# Patient Record
Sex: FEMALE | Race: WHITE | NOT HISPANIC OR LATINO | Employment: FULL TIME | URBAN - METROPOLITAN AREA
[De-identification: names, ages, dates, MRNs, and addresses within clinical notes are randomized per-mention and may not be internally consistent; named-entity substitution may affect disease eponyms.]

---

## 2022-01-12 ENCOUNTER — OFFICE VISIT (OUTPATIENT)
Dept: URGENT CARE | Facility: CLINIC | Age: 27
End: 2022-01-12
Payer: COMMERCIAL

## 2022-01-12 ENCOUNTER — HOSPITAL ENCOUNTER (OUTPATIENT)
Facility: MEDICAL CENTER | Age: 27
End: 2022-01-12
Attending: NURSE PRACTITIONER
Payer: COMMERCIAL

## 2022-01-12 VITALS
DIASTOLIC BLOOD PRESSURE: 94 MMHG | HEART RATE: 86 BPM | OXYGEN SATURATION: 99 % | WEIGHT: 240 LBS | TEMPERATURE: 98.3 F | RESPIRATION RATE: 18 BRPM | BODY MASS INDEX: 42.52 KG/M2 | SYSTOLIC BLOOD PRESSURE: 122 MMHG | HEIGHT: 63 IN

## 2022-01-12 DIAGNOSIS — J04.0 LARYNGITIS: ICD-10-CM

## 2022-01-12 DIAGNOSIS — J40 BRONCHITIS: ICD-10-CM

## 2022-01-12 DIAGNOSIS — R05.9 COUGH: ICD-10-CM

## 2022-01-12 PROCEDURE — U0005 INFEC AGEN DETEC AMPLI PROBE: HCPCS

## 2022-01-12 PROCEDURE — 99203 OFFICE O/P NEW LOW 30 MIN: CPT | Performed by: NURSE PRACTITIONER

## 2022-01-12 PROCEDURE — U0003 INFECTIOUS AGENT DETECTION BY NUCLEIC ACID (DNA OR RNA); SEVERE ACUTE RESPIRATORY SYNDROME CORONAVIRUS 2 (SARS-COV-2) (CORONAVIRUS DISEASE [COVID-19]), AMPLIFIED PROBE TECHNIQUE, MAKING USE OF HIGH THROUGHPUT TECHNOLOGIES AS DESCRIBED BY CMS-2020-01-R: HCPCS

## 2022-01-12 RX ORDER — ALBUTEROL SULFATE 90 UG/1
2 AEROSOL, METERED RESPIRATORY (INHALATION) EVERY 6 HOURS PRN
Qty: 8.5 G | Refills: 0 | Status: SHIPPED | OUTPATIENT
Start: 2022-01-12

## 2022-01-12 RX ORDER — METHYLPREDNISOLONE 4 MG/1
TABLET ORAL
Qty: 21 TABLET | Refills: 0 | Status: SHIPPED
Start: 2022-01-12 | End: 2022-02-22

## 2022-01-12 ASSESSMENT — ENCOUNTER SYMPTOMS
SHORTNESS OF BREATH: 1
CHILLS: 0
COUGH: 1
FEVER: 0

## 2022-01-13 DIAGNOSIS — J04.0 LARYNGITIS: ICD-10-CM

## 2022-01-13 DIAGNOSIS — R05.9 COUGH: ICD-10-CM

## 2022-01-13 DIAGNOSIS — J40 BRONCHITIS: ICD-10-CM

## 2022-01-13 LAB — COVID ORDER STATUS COVID19: NORMAL

## 2022-01-13 NOTE — PROGRESS NOTES
Heidi Reese is a 26 y.o. female who presents with Sore Throat (x1 week, currently pregnant ), Cough (x2 days ), Runny Nose, and Shortness of Breath    No past medical history on file.  Social History     Socioeconomic History   • Marital status: Single     Spouse name: Not on file   • Number of children: Not on file   • Years of education: Not on file   • Highest education level: Not on file   Occupational History   • Not on file   Tobacco Use   • Smoking status: Never Smoker   • Smokeless tobacco: Never Used   Vaping Use   • Vaping Use: Never used   Substance and Sexual Activity   • Alcohol use: No   • Drug use: No   • Sexual activity: Not on file   Other Topics Concern   • Not on file   Social History Narrative   • Not on file     Social Determinants of Health     Financial Resource Strain:    • Difficulty of Paying Living Expenses: Not on file   Food Insecurity:    • Worried About Running Out of Food in the Last Year: Not on file   • Ran Out of Food in the Last Year: Not on file   Transportation Needs:    • Lack of Transportation (Medical): Not on file   • Lack of Transportation (Non-Medical): Not on file   Physical Activity:    • Days of Exercise per Week: Not on file   • Minutes of Exercise per Session: Not on file   Stress:    • Feeling of Stress : Not on file   Social Connections:    • Frequency of Communication with Friends and Family: Not on file   • Frequency of Social Gatherings with Friends and Family: Not on file   • Attends [employer] Services: Not on file   • Active Member of Clubs or Organizations: Not on file   • Attends Club or Organization Meetings: Not on file   • Marital Status: Not on file   Intimate Partner Violence:    • Fear of Current or Ex-Partner: Not on file   • Emotionally Abused: Not on file   • Physically Abused: Not on file   • Sexually Abused: Not on file   Housing Stability:    • Unable to Pay for Housing in the Last Year: Not on file   • Number of Places Lived in  "the Last Year: Not on file   • Unstable Housing in the Last Year: Not on file     No family history on file.    Allergies: Patient has no known allergies.    Patient is a 26-year-old female who presents today with complaint of cough, nasal congestion, loss of voice, and sense of shortness of breath when she tries to speak.  Patient states otherwise with activity or sitting quietly as long as she is not speaking she does not feel short of breath.  Patient states she has taken a positive home pregnancy test, both in November and recently over the last week.  Patient is in the process of scheduling an appointment with an obstetrician.          Cough  This is a new problem. The current episode started in the past 7 days. The problem has been gradually worsening. The cough is non-productive. Associated symptoms include shortness of breath. Pertinent negatives include no chest pain, chills, fever, nasal congestion or postnasal drip. Nothing aggravates the symptoms. She has tried nothing for the symptoms.       Review of Systems   Constitutional: Positive for malaise/fatigue. Negative for chills and fever.   HENT: Negative for postnasal drip.    Respiratory: Positive for cough and shortness of breath.    Cardiovascular: Negative for chest pain.   All other systems reviewed and are negative.             Objective     /94   Pulse 86   Temp 36.8 °C (98.3 °F) (Temporal)   Resp 18   Ht 1.6 m (5' 3\")   Wt 109 kg (240 lb)   LMP 10/21/2021 (Approximate)   SpO2 99%   Breastfeeding No   BMI 42.51 kg/m²      Physical Exam  Vitals reviewed.   Constitutional:       Appearance: Normal appearance.   HENT:      Head: Normocephalic and atraumatic.      Right Ear: Tympanic membrane, ear canal and external ear normal.      Left Ear: Tympanic membrane, ear canal and external ear normal.      Nose: No congestion or rhinorrhea.      Mouth/Throat:      Mouth: Mucous membranes are moist.      Comments: Hoarseness noted  Eyes:      " Extraocular Movements: Extraocular movements intact.      Conjunctiva/sclera: Conjunctivae normal.      Pupils: Pupils are equal, round, and reactive to light.   Cardiovascular:      Rate and Rhythm: Normal rate and regular rhythm.      Heart sounds: Normal heart sounds.   Pulmonary:      Effort: Pulmonary effort is normal. No respiratory distress.      Breath sounds: Normal breath sounds. No stridor. No wheezing, rhonchi or rales.      Comments: Dry cough noted  Chest:      Chest wall: No tenderness.   Musculoskeletal:         General: Normal range of motion.   Skin:     General: Skin is warm.      Capillary Refill: Capillary refill takes less than 2 seconds.   Neurological:      Mental Status: She is alert and oriented to person, place, and time.   Psychiatric:         Mood and Affect: Mood normal.         Behavior: Behavior normal.         Thought Content: Thought content normal.         Judgment: Judgment normal.       Vital signs reviewed.  Oxygen saturation is within normal limits, pulse and other vitals are also within normal limits.  Patient's respirations are even and unlabored.  She is noted to be hoarse                     Assessment & Plan   For respiratory infection   laryngitis    Medrol dose laura  Albuterol inhaler  Covid nasal swab obtained  Self quarantine until results received  [facility]  Tylenol as needed, over-the-counter cough/cold medication of choice as needed  Strict ER precautions for respiratory distress  Written instructions given for self-care quarantine at home  Return to urgent care otherwise for any further questions or concerns              There are no diagnoses linked to this encounter.

## 2022-01-14 ENCOUNTER — TELEPHONE (OUTPATIENT)
Dept: URGENT CARE | Facility: PHYSICIAN GROUP | Age: 27
End: 2022-01-14

## 2022-01-14 LAB
SARS-COV-2 RNA RESP QL NAA+PROBE: NOTDETECTED
SPECIMEN SOURCE: NORMAL

## 2022-01-14 NOTE — TELEPHONE ENCOUNTER
Attempted to notify patient by phone of COVID test results.  No answer.  Left detailed voice message with results and requested callback for follow-up.  Patient does not registered her [employer] account at the time of attempted contact.

## 2022-02-22 ENCOUNTER — OFFICE VISIT (OUTPATIENT)
Dept: URGENT CARE | Facility: CLINIC | Age: 27
End: 2022-02-22
Payer: COMMERCIAL

## 2022-02-22 VITALS
TEMPERATURE: 98.7 F | WEIGHT: 227 LBS | SYSTOLIC BLOOD PRESSURE: 142 MMHG | BODY MASS INDEX: 40.22 KG/M2 | HEART RATE: 80 BPM | OXYGEN SATURATION: 100 % | HEIGHT: 63 IN | RESPIRATION RATE: 18 BRPM | DIASTOLIC BLOOD PRESSURE: 88 MMHG

## 2022-02-22 DIAGNOSIS — Z32.01 POSITIVE PREGNANCY TEST: ICD-10-CM

## 2022-02-22 DIAGNOSIS — N91.2 AMENORRHEA: ICD-10-CM

## 2022-02-22 DIAGNOSIS — L30.9 DERMATITIS: ICD-10-CM

## 2022-02-22 LAB
APPEARANCE UR: NORMAL
BILIRUB UR STRIP-MCNC: NORMAL MG/DL
COLOR UR AUTO: NORMAL
GLUCOSE UR STRIP.AUTO-MCNC: NORMAL MG/DL
INT CON NEG: NORMAL
INT CON POS: NORMAL
KETONES UR STRIP.AUTO-MCNC: 80 MG/DL
LEUKOCYTE ESTERASE UR QL STRIP.AUTO: NORMAL
NITRITE UR QL STRIP.AUTO: NORMAL
PH UR STRIP.AUTO: 7 [PH] (ref 5–8)
POC URINE PREGNANCY TEST: POSITIVE
PROT UR QL STRIP: NORMAL MG/DL
RBC UR QL AUTO: NORMAL
SP GR UR STRIP.AUTO: 1.02
UROBILINOGEN UR STRIP-MCNC: 0.2 MG/DL

## 2022-02-22 PROCEDURE — 81002 URINALYSIS NONAUTO W/O SCOPE: CPT | Performed by: NURSE PRACTITIONER

## 2022-02-22 PROCEDURE — 99214 OFFICE O/P EST MOD 30 MIN: CPT | Performed by: NURSE PRACTITIONER

## 2022-02-22 PROCEDURE — 81025 URINE PREGNANCY TEST: CPT | Performed by: NURSE PRACTITIONER

## 2022-02-22 ASSESSMENT — ENCOUNTER SYMPTOMS
MYALGIAS: 0
FLANK PAIN: 0
HEADACHES: 0
DIZZINESS: 0
CHILLS: 0
NAUSEA: 0
SORE THROAT: 0
FEVER: 0
SENSORY CHANGE: 0

## 2022-02-22 ASSESSMENT — LIFESTYLE VARIABLES: SUBSTANCE_ABUSE: 0

## 2022-02-22 NOTE — PROGRESS NOTES
Misael Ornelas is a 26 y.o. female who presents for Rash (X2 weeks, around neck area, itches ) and Referral Needed (OBGYN for 4 months pregnancy )      CONCHITA is a 26-year-old female who is accompanied by her male partner with complaints of   #1) rash around her neck. The rash is very itchy. She has no exposure to new chemicals, lotions or products. She said the rash gets worse at night. She is concerned that it could be infectious to other people. She says she is no longer able to tolerate the rash and needed to seek medical attention for it. Treatments tried: Just washing it a few times a day with soap and water and being very clean, changing her sheets. She has not taken OTC medications due to pregnancy.  She has no exposure to other persons with rashes. She has never had a thing like this before. She denies recent viral illness.  #2) she reports that she is approximately 16 weeks pregnant. She has not sought medical attention. She has done multiple home test that show that she is pregnant. She said this is really bad timing for her and she does not quite know how she feels about being pregnant. She says she has a supportive partner. She is having some personal stressors with her family. She would like to have a referral to see an OB/GYN.  G1 P O   LMP: Oct 7 2021  Morning sickness for a few months.   + breast tenderness  Unknown weight gain   No fetal movement felt  ( cw gest aga)   No vaginal fluid/ discharge  No menses since Oct.   + home pregnancy test   Denies abd discomforts.       Review of Systems   Constitutional: Negative for chills and fever.   HENT: Negative for congestion and sore throat.    Cardiovascular: Negative for chest pain.   Gastrointestinal: Negative for nausea.   Genitourinary: Negative for dysuria, flank pain, frequency, hematuria and urgency.   Musculoskeletal: Negative for myalgias.   Skin: Positive for itching and rash.   Neurological: Negative for dizziness, sensory change and headaches.  "  Endo/Heme/Allergies: Negative for environmental allergies.   Psychiatric/Behavioral: Negative for substance abuse.       No Known Allergies  History reviewed. No pertinent past medical history.  History reviewed. No pertinent surgical history.  History reviewed. No pertinent family history.  Social History     Tobacco Use   • Smoking status: Never Smoker   • Smokeless tobacco: Never Used   Substance Use Topics   • Alcohol use: No     There is no problem list on file for this patient.    Current Outpatient Medications on File Prior to Visit   Medication Sig Dispense Refill   • albuterol 108 (90 Base) MCG/ACT Aero Soln inhalation aerosol Inhale 2 Puffs every 6 hours as needed. 8.5 g 0     No current facility-administered medications on file prior to visit.          Objective:     /88   Pulse 80   Temp 37.1 °C (98.7 °F) (Temporal)   Resp 18   Ht 1.6 m (5' 3\")   Wt 103 kg (227 lb)   LMP 10/21/2021 (Approximate)   SpO2 100%   Breastfeeding No   BMI 40.21 kg/m²     Physical Exam  Vitals reviewed.   Constitutional:       Appearance: Normal appearance. She is normal weight. She is not ill-appearing.   HENT:      Mouth/Throat:      Mouth: Mucous membranes are moist.   Cardiovascular:      Rate and Rhythm: Normal rate and regular rhythm.      Pulses: Normal pulses.      Heart sounds: Normal heart sounds.   Pulmonary:      Effort: Pulmonary effort is normal.      Breath sounds: Normal breath sounds.   Abdominal:      Palpations: Abdomen is soft.   Skin:     General: Skin is warm.      Capillary Refill: Capillary refill takes less than 2 seconds.      Findings: Rash present. Rash is macular and papular.          Neurological:      Mental Status: She is alert and oriented to person, place, and time.   Psychiatric:         Mood and Affect: Mood normal.         Behavior: Behavior normal.         Thought Content: Thought content normal.         Assessment /Associated Orders:      1. Amenorrhea  POCT Pregnancy    " Referral to OB/Gyn    POCT Urinalysis   2. Positive pregnancy test  Referral to OB/Gyn    POCT Urinalysis   3. Dermatitis             Medical Decision Making:    Pt is clinically stable at today's acute urgent care visit.  No acute distress noted. Appropriate for outpatient management at this time.   OTC hydrocortisone cream prn itching. Dosage and directions per   OTC diphenhydramine for itchiness which is considered safe in pregnancy. She is advised that this medication can make her sleepy. She can use it at nighttime before going to bed since this is her most problematic time and feeling of itchiness.  OTC emollient lotions daily   Do not over wash skin   Keep well hydrated      Increase rest  Referral to OB/GYN for pregnancy care.       Advised to follow-up with the primary care provider  for recheck, reevaluation, and consideration of further management if necessary.   Discussed management options (risks,benefits, and alternatives to treatment). Pt/ caregiver expressed understanding and the treatment plan was agreed upon. Questions were encouraged and answered     Return to urgent care prn if new or worsening sx or if there is no improvement in condition prn . Red flags discussed and indications to immediately call 911 or present to the Emergency Department.     I personally reviewed prior external notes and test results pertinent to today's visit.  I have independently reviewed and interpreted all diagnostics ordered during this urgent care acute visit.   Time spent evaluating this patient was at least 30 minutes and includes preparing for visit, counseling/education, exam and evaluation, obtaining history, independent interpretation, ordering lab/test/procedures,medication management and documentation.This does not include time spent on separate billable procedures.           Please note that this dictation was created using voice recognition software. I have made a reasonable attempt to correct  obvious errors, but I expect that there are errors of grammar and possibly content that I did not discover before finalizing the note.    This note was electronically signed by Marycarmen TOMAS, [facility]